# Patient Record
Sex: FEMALE | Race: WHITE | NOT HISPANIC OR LATINO | Employment: FULL TIME | URBAN - NONMETROPOLITAN AREA
[De-identification: names, ages, dates, MRNs, and addresses within clinical notes are randomized per-mention and may not be internally consistent; named-entity substitution may affect disease eponyms.]

---

## 2017-12-12 ENCOUNTER — OFFICE VISIT (OUTPATIENT)
Dept: OTOLARYNGOLOGY | Facility: CLINIC | Age: 32
End: 2017-12-12

## 2017-12-12 VITALS — HEIGHT: 65 IN | WEIGHT: 210 LBS | TEMPERATURE: 97.9 F | BODY MASS INDEX: 34.99 KG/M2

## 2017-12-12 DIAGNOSIS — H61.22 IMPACTED CERUMEN OF LEFT EAR: ICD-10-CM

## 2017-12-12 DIAGNOSIS — H60.312 DIFFUSE OTITIS EXTERNA OF LEFT EAR, UNSPECIFIED CHRONICITY: Primary | ICD-10-CM

## 2017-12-12 DIAGNOSIS — H90.12 CONDUCTIVE HEARING LOSS OF LEFT EAR, UNSPECIFIED HEARING STATUS ON CONTRALATERAL SIDE: ICD-10-CM

## 2017-12-12 PROCEDURE — 69210 REMOVE IMPACTED EAR WAX UNI: CPT | Performed by: OTOLARYNGOLOGY

## 2017-12-12 PROCEDURE — 99203 OFFICE O/P NEW LOW 30 MIN: CPT | Performed by: OTOLARYNGOLOGY

## 2017-12-12 RX ORDER — OFLOXACIN 3 MG/ML
5 SOLUTION AURICULAR (OTIC)
COMMUNITY
Start: 2017-12-11 | End: 2017-12-18

## 2017-12-12 RX ORDER — IBUPROFEN 800 MG/1
TABLET ORAL
COMMUNITY
Start: 2017-12-11

## 2017-12-12 RX ORDER — CIPROFLOXACIN 500 MG/1
500 TABLET, FILM COATED ORAL
COMMUNITY
Start: 2017-12-09 | End: 2017-12-20

## 2017-12-12 RX ORDER — PREDNISONE 20 MG/1
20 TABLET ORAL
COMMUNITY
Start: 2017-12-11 | End: 2017-12-18

## 2017-12-13 NOTE — PROGRESS NOTES
Subjective   Tamika Mccain is a 32 y.o. female.   CC otitis externa and hearing loss  History of Present Illness   Patient complains of left otitis externa and hearing loss.  Since she's been seen to 3 times and primary care physicians and she's now starting to get some improvement placed on Cipro prednisone and Floxin drops.  A wick was placed the last visit with primary care.  She's improving terms of pain was still has hearing loss the facial swelling and cellulitis she had is improved.  Says she's had this in past not to ear and uses Q-tips or ear and history of infection.  She currently does not have a fever as take her medications complication      The following portions of the patient's history were reviewed and updated as appropriate: allergies, current medications, past family history, past medical history, past social history, past surgical history and problem list.      Tamika Mccain reports that she has been smoking.  She has never used smokeless tobacco.  Patient is a tobacco user and has not been counseled for use of tobacco products    No family history on file.      Current Outpatient Prescriptions:   •  ciprofloxacin (CIPRO) 500 MG tablet, Take 500 mg by mouth., Disp: , Rfl:   •  ibuprofen (ADVIL,MOTRIN) 800 MG tablet, TAKE 1 PO Q 6-8 HR PRN EAR PAIN, Disp: , Rfl:   •  ofloxacin (FLOXIN) 0.3 % otic solution, Administer 5 drops into ears., Disp: , Rfl:   •  predniSONE (DELTASONE) 20 MG tablet, Take 20 mg by mouth., Disp: , Rfl:   •  omeprazole (priLOSEC) 20 MG capsule, Take 20 mg by mouth Daily., Disp: , Rfl:   •  ondansetron ODT (ZOFRAN-ODT) 8 MG disintegrating tablet, Take 1 tablet by mouth Every 8 (Eight) Hours As Needed for Nausea or Vomiting., Disp: 8 tablet, Rfl: 0    Allergies   Allergen Reactions   • Keflex [Cephalexin] Rash       No past medical history on file.      Review of Systems   Constitutional: Negative for fever.   HENT: Positive for ear discharge, ear pain,  facial swelling and hearing loss.    Neurological: Positive for facial asymmetry.   All other systems reviewed and are negative.          Objective   Physical Exam   Constitutional: She is oriented to person, place, and time. She appears well-developed and well-nourished.   HENT:   Head: Normocephalic and atraumatic. Macrocephalic:  no cellulitis currently.       Right Ear: Hearing, tympanic membrane, external ear and ear canal normal.   Left Ear: External ear normal. There is drainage and swelling. Decreased hearing is noted.   Ears:    Nose: Septal deviation present. No mucosal edema, rhinorrhea or nasal deformity. No epistaxis. Right sinus exhibits no maxillary sinus tenderness and no frontal sinus tenderness. Left sinus exhibits no maxillary sinus tenderness and no frontal sinus tenderness.   Mouth/Throat: Uvula is midline, oropharynx is clear and moist and mucous membranes are normal. No trismus in the jaw. Normal dentition. No oropharyngeal exudate or posterior oropharyngeal edema. No tonsillar exudate.   Eyes: Conjunctivae and EOM are normal. Pupils are equal, round, and reactive to light.   Neck: Normal range of motion. Neck supple. No JVD present. No tracheal deviation present. No thyromegaly present.   Cardiovascular: Normal rate.    Pulmonary/Chest: Effort normal.   Musculoskeletal: Normal range of motion.   Lymphadenopathy:        Head (right side): No submental, no submandibular, no tonsillar, no preauricular, no posterior auricular and no occipital adenopathy present.        Head (left side): No submental, no submandibular, no tonsillar, no preauricular, no posterior auricular and no occipital adenopathy present.     She has no cervical adenopathy.        Right cervical: No superficial cervical, no deep cervical and no posterior cervical adenopathy present.       Left cervical: No superficial cervical, no deep cervical and no posterior cervical adenopathy present.   Neurological: She is alert and  oriented to person, place, and time. No cranial nerve deficit.   Skin: Skin is warm.   Psychiatric: She has a normal mood and affect. Her speech is normal and behavior is normal. Thought content normal.   Nursing note and vitals reviewed.  Cerumen was removed from ear canal with use of suction the microscope and peroxide this allowed small part of the tympanic membranes be seen which cannot be seen previously.   This procedure was done atraumatically without complication          Assessment/Plan   Tamika was seen today for ear problem.    Diagnoses and all orders for this visit:    Diffuse otitis externa of left ear, unspecified chronicity    Conductive hearing loss of left ear, unspecified hearing status on contralateral side    Impacted cerumen of left ear      Patient will call if not improving  Days.  She'll finish her antibiotic.  She will stop the prednisone and 4 days of continuing improve the ear dry she'll call to is not coming out by Friday.  Otherwise she'll follow up in 1 week and was having problems   I discussed importance of not using Q-tips for wax removal in the future

## 2017-12-18 ENCOUNTER — OFFICE VISIT (OUTPATIENT)
Dept: OTOLARYNGOLOGY | Facility: CLINIC | Age: 32
End: 2017-12-18

## 2017-12-18 VITALS — HEIGHT: 65 IN | BODY MASS INDEX: 34.99 KG/M2 | TEMPERATURE: 97.7 F | WEIGHT: 210 LBS

## 2017-12-18 DIAGNOSIS — H61.22 IMPACTED CERUMEN OF LEFT EAR: ICD-10-CM

## 2017-12-18 DIAGNOSIS — H90.12 CONDUCTIVE HEARING LOSS OF LEFT EAR, UNSPECIFIED HEARING STATUS ON CONTRALATERAL SIDE: ICD-10-CM

## 2017-12-18 DIAGNOSIS — H60.312 DIFFUSE OTITIS EXTERNA OF LEFT EAR, UNSPECIFIED CHRONICITY: Primary | ICD-10-CM

## 2017-12-18 PROCEDURE — 99213 OFFICE O/P EST LOW 20 MIN: CPT | Performed by: OTOLARYNGOLOGY

## 2017-12-18 RX ORDER — OFLOXACIN 3 MG/ML
5 SOLUTION AURICULAR (OTIC) 2 TIMES DAILY
Qty: 10 ML | Refills: 1 | Status: SHIPPED | OUTPATIENT
Start: 2017-12-18

## 2017-12-18 NOTE — PROGRESS NOTES
Subjective   Tamika Mccain is a 32 y.o. female.     C: Follow up otitis externa and left  History of Present Illness     States she's doing much better still feels a little stopped up and full but not having the pain the wick came out 2 days after was placed..  Currently thi-drainage or significant pain though hearing still distorted as noted    The following portions of the patient's history were reviewed and updated as appropriate: allergies, current medications, past family history, past medical history, past social history, past surgical history and problem list.      Current Outpatient Prescriptions:   •  ciprofloxacin (CIPRO) 500 MG tablet, Take 500 mg by mouth., Disp: , Rfl:   •  omeprazole (priLOSEC) 20 MG capsule, Take 20 mg by mouth Daily., Disp: , Rfl:   •  ibuprofen (ADVIL,MOTRIN) 800 MG tablet, TAKE 1 PO Q 6-8 HR PRN EAR PAIN, Disp: , Rfl:   •  ofloxacin (FLOXIN) 0.3 % otic solution, Administer 5 drops into the left ear 2 (Two) Times a Day., Disp: 10 mL, Rfl: 1  •  ondansetron ODT (ZOFRAN-ODT) 8 MG disintegrating tablet, Take 1 tablet by mouth Every 8 (Eight) Hours As Needed for Nausea or Vomiting., Disp: 8 tablet, Rfl: 0    Allergies   Allergen Reactions   • Keflex [Cephalexin] Rash            Review of Systems   Constitutional: Negative for fever.   HENT: Positive for hearing loss. Negative for ear discharge and ear pain.    Hematological: Negative for adenopathy.           Objective   Physical Exam   Constitutional: She is oriented to person, place, and time. She appears well-developed and well-nourished.   HENT:   Head: Normocephalic and atraumatic.   Right Ear: Hearing, tympanic membrane, external ear and ear canal normal. Right ear foreign body:  exceeded eardrum which was intact.   Left Ear: Hearing, tympanic membrane and external ear normal.   Ears:    Nose: Nose normal. No mucosal edema, rhinorrhea, nasal deformity or septal deviation. No epistaxis. Right sinus exhibits no maxillary  sinus tenderness and no frontal sinus tenderness. Left sinus exhibits no maxillary sinus tenderness and no frontal sinus tenderness.   Mouth/Throat: Uvula is midline, oropharynx is clear and moist and mucous membranes are normal. No trismus in the jaw. Normal dentition. No oropharyngeal exudate or posterior oropharyngeal edema. No tonsillar exudate.   Eyes: Conjunctivae are normal.   Neck: Trachea normal, normal range of motion, full passive range of motion without pain and phonation normal. Neck supple. No JVD present. No tracheal deviation present. No thyroid mass and no thyromegaly present.   Cardiovascular: Normal rate.    Pulmonary/Chest: Effort normal.   Musculoskeletal: Normal range of motion.   Lymphadenopathy:        Head (right side): No submental, no submandibular, no tonsillar, no preauricular, no posterior auricular and no occipital adenopathy present.        Head (left side): No submental, no submandibular, no tonsillar, no preauricular, no posterior auricular and no occipital adenopathy present.     She has no cervical adenopathy.        Right cervical: No superficial cervical, no deep cervical and no posterior cervical adenopathy present.       Left cervical: No superficial cervical, no deep cervical and no posterior cervical adenopathy present.   Neurological: She is alert and oriented to person, place, and time. No cranial nerve deficit.   Skin: Skin is warm.   Psychiatric: She has a normal mood and affect. Her speech is normal and behavior is normal. Thought content normal.   Nursing note and vitals reviewed.          Assessment/Plan   Tamika was seen today for follow-up.    Diagnoses and all orders for this visit:    Diffuse otitis externa of left ear, unspecified chronicity    Conductive hearing loss of left ear, unspecified hearing status on contralateral side    Impacted cerumen of left ear    Other orders  -     ofloxacin (FLOXIN) 0.3 % otic solution; Administer 5 drops into the left ear 2  (Two) Times a Day.          Dry drops twice a day for 5 days call appearing not better next week if improving near normal she is to keep the ear dry and not use Q-tips in follow-up in 3 months to recheck all questions were answered not going back to normal or getting worse she is to call us we'll see her sooner

## 2021-08-06 ENCOUNTER — IMMUNIZATION (OUTPATIENT)
Dept: VACCINE CLINIC | Facility: HOSPITAL | Age: 36
End: 2021-08-06

## 2021-08-06 PROCEDURE — 91300 HC SARSCOV02 VAC 30MCG/0.3ML IM: CPT | Performed by: THORACIC SURGERY (CARDIOTHORACIC VASCULAR SURGERY)

## 2021-08-06 PROCEDURE — 0001A: CPT | Performed by: THORACIC SURGERY (CARDIOTHORACIC VASCULAR SURGERY)

## 2021-08-27 ENCOUNTER — IMMUNIZATION (OUTPATIENT)
Dept: VACCINE CLINIC | Facility: HOSPITAL | Age: 36
End: 2021-08-27

## 2021-08-27 PROCEDURE — 0002A: CPT | Performed by: PSYCHIATRY & NEUROLOGY

## 2021-08-27 PROCEDURE — 91300 HC SARSCOV02 VAC 30MCG/0.3ML IM: CPT | Performed by: PSYCHIATRY & NEUROLOGY

## 2023-09-13 PROCEDURE — 87635 SARS-COV-2 COVID-19 AMP PRB: CPT | Performed by: NURSE PRACTITIONER
